# Patient Record
Sex: FEMALE | Race: BLACK OR AFRICAN AMERICAN
[De-identification: names, ages, dates, MRNs, and addresses within clinical notes are randomized per-mention and may not be internally consistent; named-entity substitution may affect disease eponyms.]

---
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
## 2020-01-23 NOTE — PD
HPI


Chief Complaint:  Fever


Time Seen by Provider:  13:18


Travel History


International Travel<30 days:  No


Contact w/Intl Traveler<30days:  No


Traveled to known affect area:  No





History of Present Illness


HPI


17-year-old  female presents emergency department complaining of headache, 

fever, right flank pain that started approximately 1 week ago.  Patient states 

that she woke up feeling feverish and had accompanied headache and flank pain.  

Patient states that her pain is located in the right lower flank region and 

radiates to the anterior pelvis, mild to moderate in severity,.  Says her 

headaches been intermittent and is slightly resolved with ibuprofen.  Patient 

has not checked her temperature but says she has felt warm with chills over the 

last week.  She denies cough, congestion, nausea, vomiting or diarrhea.  States 

she has never had these symptoms before.  Says in addition she has had spotting 

since her delivery 1 month ago.  She has not had follow-up with her 

obstetrician but is due to follow-up with the .  Says she had  

hypertension and continues to take medication for this.  In addition she has 

seasonal asthma but denies any shortness of breath or asthma type symptoms.





PFSH


Past Medical History


Diminished Hearing:  No


Immunizations Current:  Yes





Social History


Alcohol Use:  No


Tobacco Use:  No





Allergies-Medications


(Allergen,Severity, Reaction):  


Coded Allergies:  


     No Known Allergies (Verified  Adverse Reaction, Unknown, 3/15/18)


Reported Meds & Prescriptions





Reported Meds & Active Scripts


Active


Nifedipine ER 24 HR (Nifedipine) 30 Mg Tab 30 Mg PO DAILY


Gnp Senna Plus 8.6-50 mg (Sennosides-Docusate Sodium) 8.6 Mg-50 Mg Tab 2 Tab PO 

Q12H PRN


Ibuprofen 800 Mg Tab 800 Mg PO Q8H PRN


Zantac (Ranitidine HCl) 150 Mg Tab 150 Mg PO BID








Review of Systems


Except as stated in HPI:  all other systems reviewed are Neg





Physical Exam


Narrative


GENERAL: Well-developed, well-nourished in no apparent distress


SKIN: Focused skin assessment warm/dry.  Slightly diaphoretic


HEAD: Atraumatic. Normocephalic. 


EYES: Pupils equal and round. No scleral icterus. No injection or drainage. 


ENT: No nasal bleeding or discharge.  Mucous membranes pink and moist.


NECK: Trachea midline. No JVD.  No lymphadenopathy


CARDIOVASCULAR: Tachycardic rate and rhythm.  No murmur appreciated.


RESPIRATORY: No accessory muscle use. Clear to auscultation. Breath sounds 

equal bilaterally. 


GASTROINTESTINAL: Abdomen soft, nondistended.  No rebound tenderness, no 

ecchymosis, right lower quadrant tenderness to palpation without radiation of 

pain, negative psoas


MUSCULOSKELETAL: No obvious deformities. No clubbing.  No cyanosis.  No edema. 


NEUROLOGICAL: Awake and alert. No obvious cranial nerve deficits.  Motor 

grossly within normal limits. Normal speech.


PSYCHIATRIC: Appropriate mood and affect; insight and judgment normal.





Data


Data


Last Documented VS





Vital Signs








  Date Time  Temp Pulse Resp B/P (MAP) Pulse Ox O2 Delivery O2 Flow Rate FiO2


 


3/15/18 16:04 98.9 118 16 124/68 (86) 100 Room Air  








Orders





 Orders


Complete Blood Count With Diff (3/15/18 13:44)


Comprehensive Metabolic Panel (3/15/18 13:44)


Lipase (3/15/18 13:44)


Lactic Acid (3/15/18 13:44)


Prothrombin Time / Inr (Pt) (3/15/18 13:44)


Act Partial Throm Time (Ptt) (3/15/18 13:44)


Urinalysis - C+S If Indicated (3/15/18 13:44)


Ct Abd/Pel W Iv Contrast(Rout) (3/15/18 13:44)


Influenzae A/B Antigen (3/15/18 13:44)


Acetaminophen (Tylenol) (3/15/18 13:45)


Blood Culture (3/15/18 13:44)


Chest, Single Ap (3/15/18 )


Sodium Chlor 0.9% 1000 Ml Inj (Ns 1000 M (3/15/18 14:00)


Electrocardiogram (3/15/18 )


Oral Contrast - Adult (3/15/18 13:58)


Urine Culture (3/15/18 14:05)


Diatrizoate Liq (Md Gastroview Liq) (3/15/18 14:38)


Ceftriaxone Inj (Rocephin Inj) (3/15/18 14:45)


Potassium Chloride (Kcl) (3/15/18 15:00)


Magnesium (Mg) (3/15/18 14:53)


Potassium Chlor 20 Meq Premix (Kcl 20 Me (3/15/18 15:00)


Iohexol 350 Inj (Omnipaque 350 Inj) (3/15/18 13:01)


Admit Order (Ed Use Only) (3/15/18 17:05)





Labs





Laboratory Tests








Test


  3/15/18


14:05


 


White Blood Count 13.2 TH/MM3 


 


Red Blood Count 4.51 MIL/MM3 


 


Hemoglobin 12.1 GM/DL 


 


Hematocrit 37.2 % 


 


Mean Corpuscular Volume 82.6 FL 


 


Mean Corpuscular Hemoglobin 26.8 PG 


 


Mean Corpuscular Hemoglobin


Concent 32.5 % 


 


 


Red Cell Distribution Width 13.4 % 


 


Platelet Count 288 TH/MM3 


 


Mean Platelet Volume 9.1 FL 


 


Neutrophils (%) (Auto) 70.7 % 


 


Lymphocytes (%) (Auto) 17.6 % 


 


Monocytes (%) (Auto) 11.4 % 


 


Eosinophils (%) (Auto) 0.1 % 


 


Basophils (%) (Auto) 0.2 % 


 


Neutrophils # (Auto) 9.3 TH/MM3 


 


Lymphocytes # (Auto) 2.3 TH/MM3 


 


Monocytes # (Auto) 1.5 TH/MM3 


 


Eosinophils # (Auto) 0.0 TH/MM3 


 


Basophils # (Auto) 0.0 TH/MM3 


 


CBC Comment DIFF FINAL 


 


Differential Comment  


 


Prothrombin Time 11.5 SEC 


 


Prothromb Time International


Ratio 1.1 RATIO 


 


 


Activated Partial


Thromboplast Time 31.5 SEC 


 


 


Urine Color YELLOW 


 


Urine Turbidity CLOUDY 


 


Urine pH 6.0 


 


Urine Specific Gravity 1.019 


 


Urine Protein 100 mg/dL 


 


Urine Glucose (UA) NEG mg/dL 


 


Urine Ketones NEG mg/dL 


 


Urine Occult Blood LARGE 


 


Urine Nitrite NEG 


 


Urine Bilirubin NEG 


 


Urine Urobilinogen


  LESS THAN 2.0


MG/DL


 


Urine Leukocyte Esterase LARGE 


 


Urine RBC 39 /hpf 


 


Urine WBC  /hpf 


 


Urine WBC Clumps MANY 


 


Urine Squamous Epithelial


Cells 11 /hpf 


 


 


Urine Bacteria MANY /hpf 


 


Urine Mucus FEW /lpf 


 


Microscopic Urinalysis Comment


  CULTURE


INDICATED


 


Blood Urea Nitrogen 5 MG/DL 


 


Creatinine 0.95 MG/DL 


 


Random Glucose 107 MG/DL 


 


Total Protein 7.7 GM/DL 


 


Albumin 3.5 GM/DL 


 


Calcium Level 9.0 MG/DL 


 


Alkaline Phosphatase 96 U/L 


 


Aspartate Amino Transf


(AST/SGOT) 17 U/L 


 


 


Alanine Aminotransferase


(ALT/SGPT) 17 U/L 


 


 


Total Bilirubin 1.2 MG/DL 


 


Sodium Level 136 MEQ/L 


 


Potassium Level 2.4 MEQ/L 


 


Chloride Level 99 MEQ/L 


 


Carbon Dioxide Level 23.1 MEQ/L 


 


Anion Gap 14 MEQ/L 


 


Lactic Acid Level 1.4 mmol/L 


 


Lipase 109 U/L 











[facility]


Medical Decision Making


Medical Screen Exam Complete:  Yes


Emergency Medical Condition:  Yes


Differential Diagnosis


Pyelonephritis, cystitis, urinary tract infection, sepsis, tubo-ovarian abscess

, myometritis,


Narrative Course


17-year-old female presents emergency department with headache, right flank pain

, and fever proximal approximately 1 week.


EKG shows sinus tachycardia at a rate of 140s, no STEMI changes.


Initial vital signs show temperature 103.2, heart rate 140-160, SaO2 100%


Labs and imaging studies ordered.


Sepsis protocol activated.





CBC & BMP Diagram


3/15/18 14:05








Total Protein 7.7, Albumin 3.5, Calcium Level 9.0, Alkaline Phosphatase 96, 

Aspartate Amino Transf (AST/SGOT) 17, Alanine Aminotransferase (ALT/SGPT) 17, 

Total Bilirubin 1.2


Lactic acid 1.4, potassium 2.4.  UA suggestive of urinary tract infection.


Influenza negative.


Patient received 3 L normal saline IV fluid bolus.  Patient's heart rate 

remains 120.  Blood pressure stable.  Temperature 98.9 after Tylenol and fluids.


Ceftriaxone 1g administered IV.





CT abdomen pelvis suggestive of pyelonephritis.


Because patient's heart rate remains 120 and concern for stability will admit 

patient for observation and IV abx.





Sepsis Criteria


SIRS Criteria (2 or more):  Temp > 100.9 or < 96.8, Heart rate over 90, WBC > 

39256, < 4000 or > 10% bands


Sepsis Criteria (SIRS+source):  Infect source susp/known





Diagnosis





 Primary Impression:  


 Pyelonephritis


 Additional Impression:  


 Hypokalemia





Admitting Information


Admitting Physician Requests:  Observation


Condition:  Stable











Barbara Howard Mar 15, 2018 13:37 Tranexamic Acid Counseling:  Patient advised of the small risk of bleeding problems with tranexamic acid. They were also instructed to call if they developed any nausea, vomiting or diarrhea. All of the patient's questions and concerns were addressed.